# Patient Record
Sex: MALE | Race: WHITE | NOT HISPANIC OR LATINO | Employment: OTHER | URBAN - METROPOLITAN AREA
[De-identification: names, ages, dates, MRNs, and addresses within clinical notes are randomized per-mention and may not be internally consistent; named-entity substitution may affect disease eponyms.]

---

## 2017-01-12 ENCOUNTER — OFFICE VISIT (OUTPATIENT)
Dept: PAIN MEDICINE | Facility: CLINIC | Age: 67
End: 2017-01-12
Payer: MEDICARE

## 2017-01-12 VITALS
DIASTOLIC BLOOD PRESSURE: 81 MMHG | HEART RATE: 92 BPM | SYSTOLIC BLOOD PRESSURE: 147 MMHG | BODY MASS INDEX: 28.62 KG/M2 | HEIGHT: 70 IN | WEIGHT: 199.94 LBS

## 2017-01-12 DIAGNOSIS — M54.16 LUMBAR RADICULOPATHY: Primary | ICD-10-CM

## 2017-01-12 DIAGNOSIS — M54.16 LUMBAR RADICULOPATHY: ICD-10-CM

## 2017-01-12 DIAGNOSIS — M51.36 DDD (DEGENERATIVE DISC DISEASE), LUMBAR: Primary | ICD-10-CM

## 2017-01-12 DIAGNOSIS — M48.061 LUMBAR STENOSIS: ICD-10-CM

## 2017-01-12 PROCEDURE — 1159F MED LIST DOCD IN RCRD: CPT | Mod: S$GLB,,, | Performed by: PHYSICIAN ASSISTANT

## 2017-01-12 PROCEDURE — 99999 PR PBB SHADOW E&M-EST. PATIENT-LVL III: CPT | Mod: PBBFAC,,, | Performed by: PHYSICIAN ASSISTANT

## 2017-01-12 PROCEDURE — 99214 OFFICE O/P EST MOD 30 MIN: CPT | Mod: S$GLB,,, | Performed by: PHYSICIAN ASSISTANT

## 2017-01-12 RX ORDER — OXYCODONE AND ACETAMINOPHEN 7.5; 325 MG/1; MG/1
1 TABLET ORAL EVERY 12 HOURS PRN
Qty: 60 TABLET | Refills: 0 | Status: SHIPPED | OUTPATIENT
Start: 2017-01-12 | End: 2017-02-16 | Stop reason: SDUPTHER

## 2017-01-12 RX ORDER — AMOXICILLIN 500 MG
2 CAPSULE ORAL DAILY
COMMUNITY
End: 2019-04-03

## 2017-01-12 RX ORDER — GABAPENTIN 300 MG/1
300 CAPSULE ORAL 3 TIMES DAILY
Qty: 90 CAPSULE | Refills: 0 | Status: SHIPPED | OUTPATIENT
Start: 2017-01-12 | End: 2019-05-24

## 2017-01-12 RX ORDER — MULTIVITAMIN
1 TABLET ORAL DAILY
COMMUNITY

## 2017-01-12 NOTE — MR AVS SNAPSHOT
[place] [facility]  [address] [place] [address]  [address]  [place] [address]  Phone: [phone]                  Bret Black JrRobert   2017 11:00 AM   Office Visit    Description:  Male : 1950   Provider:  Rochelle Chavarria PA-C   Department:  [facility]           Reason for Visit     Low-back Pain     Leg Pain                To Do List           Goals (5 Years of Data)     None       These Medications        Disp Refills Start End    gabapentin (NEURONTIN) 300 MG capsule 90 capsule 0 2017    Take 1 capsule (300 mg total) by mouth 3 (three) times daily. Start Gabapentin 300mg TID for radicular pain. Will start at 300 mg qhs for 1 week, if tolerated, increase to 300 mg in am and pm for 1 week, if tolerated increase to TID. We will titrate up to 1587-4520 mg based on therapeutic response and SEs. - Oral    Pharmacy: [facility]  [place], [place] -  [facility]  [facility] Ph #: [phone]       oxycodone-acetaminophen (PERCOCET) 7.5-325 mg per tablet 60 tablet 0 2017    Take 1 tablet by mouth every 12 (twelve) hours as needed for Pain. - Oral    Pharmacy: [facility] [place] [place] -  [facility]  [facility] Ph #: [phone]         [facility]     [facility] -  Assistance  Registered nurses in the [facility] provide clinical advisement, health education, appointment booking, and other advisory services.  Call for this free service at [phone].             Medications           Message regarding Medications     Verify the changes and/or additions to your medication regime listed below are the same as discussed with your clinician today.  If any of these changes or additions are incorrect, please notify your healthcare provider.        START taking these NEW medications        Refills    gabapentin (NEURONTIN) 300 MG capsule 0    Sig: Take 1 capsule (300 mg total)  by mouth 3 (three) times daily. Start Gabapentin 300mg TID for radicular pain. Will start at 300 mg qhs for 1 week, if tolerated, increase to 300 mg in am and pm for 1 week, if tolerated increase to TID. We will titrate up to 1069-7629 mg based on therapeutic response and SEs.    Class: Print    Route: Oral    oxycodone-acetaminophen (PERCOCET) 7.5-325 mg per tablet 0    Sig: Take 1 tablet by mouth every 12 (twelve) hours as needed for Pain.    Class: Print    Route: Oral           Verify that the below list of medications is an accurate representation of the medications you are currently taking.  If none reported, the list may be blank. If incorrect, please contact your healthcare provider. Carry this list with you in case of emergency.           Current Medications     CYANOCOBALAMIN, VITAMIN B-12, (VITAMIN B-12 ORAL) Take by mouth.    duloxetine (CYMBALTA) 30 MG capsule Take 60 mg by mouth once daily.     fish oil-omega-3 fatty acids 300-1,000 mg capsule Take 2 g by mouth once daily.    glimepiride (AMARYL) 2 MG tablet Take 2 mg by mouth before breakfast.    metformin (GLUCOPHAGE) 1000 MG tablet Take 1,000 mg by mouth 2 (two) times daily.    multivitamin (ONE DAILY MULTIVITAMIN) per tablet Take 1 tablet by mouth once daily.    gabapentin (NEURONTIN) 300 MG capsule Take 1 capsule (300 mg total) by mouth 3 (three) times daily. Start Gabapentin 300mg TID for radicular pain. Will start at 300 mg qhs for 1 week, if tolerated, increase to 300 mg in am and pm for 1 week, if tolerated increase to TID. We will titrate up to 2756-3673 mg based on therapeutic response and SEs.    metformin (GLUCOPHAGE) 500 MG tablet Take 1,000 mg by mouth 2 (two) times daily with meals.    oxycodone-acetaminophen (PERCOCET) 7.5-325 mg per tablet Take 1 tablet by mouth every 12 (twelve) hours as needed for Pain.           Clinical Reference Information           Vital Signs - Last Recorded  Most recent update: 1/12/2017 11:12 AM by Natividad STANLEY  "Bukaske, LPN    BP Pulse Ht Wt BMI    (!) 147/81 92 5' 10" (1.778 m) 90.7 kg (199 lb 15.3 oz) 28.69 kg/m2      Blood Pressure          Most Recent Value    BP  (!)  147/81      Allergies as of 1/12/2017     No Known Allergies      Immunizations Administered on Date of Encounter - 1/12/2017     None      "

## 2017-01-12 NOTE — PROGRESS NOTES
Referring Physician: No ref. provider found    PCP: Arash Johnson MD      CC: Low back and bilateral leg pain    Interval History:    Interval History:  Mr. Black is a 66 y.o. male with chronic low back pain with radicular pain who presents today for f/u s/p b/l L4-5 TFESI. Reports >60% improvement for 3 days. Pain then gradually returned on the left. Pain on right continues to be improved. He has had good relief in the past with ESIs. Pain radiates down left posterolateral thigh to his knee. Pain is shooting in nature. He has not tried any anti neuropathic agents. Surgery has been suggested in the past but he would like to avoid surgery. He does request pain medication today. In the past he was managed on high dose opiates. He took subaxone for several years. He has not taken any medication for pain in the last year. Pain today is rated 5/10.  Pt has been seen in the clinic before, however pt is new to me.     History below per Dr. Buckley    HPI:   Patient is 65-year-old male who presents with low back and bilateral leg pain.  Pain has been present for over 15 years.  No recent traumatic incident.  His constant aching, burning pain in his low back and buttocks.  Pain radiates down his bilateral thighs just pass his bilateral knees.  Pain worsens with bending, walking, lifting, getting up.  Pain improves with rest.  MRI lumbar spine showed severe L4-5 stenosis as well as lumbar spondylosis.  He has tried physical therapy with minimal benefit.  He has not had any recent lumbar injections.  He denies any weakness.  No bowel bladder changes.  He rates his pain 4/10 today, 6/10 usually, 8/10 at worse.    ROS:  CONSTITUTIONAL: No fevers, chills, night sweats, wt. loss, appetite changes  SKIN: no rashes or itching  ENT: No headaches, head trauma, vision changes, or eye pain  LYMPH NODES: None noticed   CV: No chest pain, palpitations.   RESP: No shortness of breath, dyspnea on exertion, cough, wheezing, or hemoptysis  GI:  "No nausea, emesis, diarrhea, constipation, melena, hematochezia, pain.    : No dysuria, hematuria, urgency, or frequency   HEME: No easy bruising, bleeding problems  PSYCHIATRIC: No depression, anxiety, psychosis, hallucinations.  NEURO: No seizures, memory loss, dizziness or difficulty sleeping  MSK: + History of present illness      Past Medical History   Diagnosis Date    Arthritis     Cancer      skin    Depression     Diabetes mellitus     Hypertension      Past Surgical History   Procedure Laterality Date    Carpal tunnel release       right hand     No family history on file.  Social History     Social History    Marital status:      Spouse name: N/A    Number of children: N/A    Years of education: N/A     Social History Main Topics    Smoking status: Never Smoker    Smokeless tobacco: None    Alcohol use No    Drug use: No    Sexual activity: Not Asked     Other Topics Concern    None     Social History Narrative         Medications/Allergies: See med card    Vitals:    01/12/17 1107   BP: (!) 147/81   Pulse: 92   Weight: 90.7 kg (199 lb 15.3 oz)   Height: 5' 10" (1.778 m)   PainSc:   5   PainLoc: Back         Physical exam:    GENERAL: A and O x3, the patient appears well groomed and is in no acute distress.  Skin: No rashes or obvious lesions  HEENT: normocephalic, atraumatic  CARDIOVASCULAR:  RRR  LUNGS: non labored breathing  ABDOMEN: soft, nontender   UPPER EXTREMITIES: Normal alignment, normal range of motion, no atrophy, no skin changes,  hair growth and nail growth normal and equal bilaterally. No swelling, no tenderness.    LOWER EXTREMITIES:  Normal alignment, normal range of motion, no atrophy, no skin changes,  hair growth and nail growth normal and equal bilaterally. No swelling, no tenderness.    LUMBAR SPINE  Lumbar spine: ROM is limited with flexion extension and oblique extension with moderate increased pain.    Juan's test causes no increased pain on either side. "    Supine straight leg raise is negative bilaterally.    Internal and external rotation of the hip causes no increased pain on either side.  Myofascial exam: Mild tenderness to palpation across lumbar paraspinous muscles.      MENTAL STATUS: normal orientation, speech, language, and fund of knowledge for social situation.  Emotional state appropriate.    CRANIAL NERVES:  II:  PERRL bilaterally,   III,IV,VI: EOMI.    V:  Facial sensation equal bilaterally  VII:  Facial motor function normal.  VIII:  Hearing equal to finger rub bilaterally  IX/X: Gag normal, palate symmetric  XI:  Shoulder shrug equal, head turn equal  XII:  Tongue midline without fasciculations      MOTOR: Tone and bulk: normal bilateral upper and lower Strength: normal   Delt Bi Tri WE WF     R 5 5 5 5 5 5   L 5 5 5 5 5 5     IP ADD ABD Quad TA Gas HAM  R 5 5 5 5 5 5 5  L 5 5 5 5 5 5 5    SENSATION: Light touch and pinprick intact bilaterally  REFLEXES: normal, symmetric, nonbrisk.  Toes down, no clonus. No hoffmans.  GAIT: uses cane for assistance      Imaging:  MRI L-spine 1/2015  Multilevel degenerative lumbar spondylosis resulting in:  L2-3 mild spinal canal narrowing  L3-4 moderate spinal canal narrowing  L4-5 severe spinal canal narrowing.    Assessment:  Mr. Black is a 66 y.o. male with low back and left leg pain   1. DDD (degenerative disc disease), lumbar    2. Lumbar radiculopathy    3. Lumbar stenosis          Plan:  1. I have stressed the importance of physical activity and exercise to improve overall health  2. Schedule left L4-5 and L5-S1 TFESI. I have explained the risks, benefits, and alternatives of the procedure in detail. The patient voices understanding and all questions have been answered. The patient agrees to proceed as planned. Written Consent obtained.   3. Percocet 7.5/325 mg q 12 h prn #60  4. Start Gabapentin 300mg TID for radicular pain. Will start at 300 mg qhs for 1 week, if tolerated, increase to 300 mg in am and  pm for 1 week, if tolerated increase to TID. We will titrate up to 4978-5498 mg based on therapeutic response and SEs.  5. F/u 3 weeks s/p TANNER  All medication management was performed by Dr. Milan Buckley

## 2017-01-19 ENCOUNTER — SURGERY (OUTPATIENT)
Age: 67
End: 2017-01-19

## 2017-01-19 ENCOUNTER — HOSPITAL ENCOUNTER (OUTPATIENT)
Facility: AMBULARY SURGERY CENTER | Age: 67
Discharge: HOME | End: 2017-01-19
Attending: ANESTHESIOLOGY | Admitting: ANESTHESIOLOGY
Payer: MEDICARE

## 2017-01-19 DIAGNOSIS — M51.36 DDD (DEGENERATIVE DISC DISEASE), LUMBAR: Primary | ICD-10-CM

## 2017-01-19 LAB — POCT GLUCOSE: 148 MG/DL (ref 70–110)

## 2017-01-19 PROCEDURE — 64483 NJX AA&/STRD TFRM EPI L/S 1: CPT | Mod: LT,,, | Performed by: ANESTHESIOLOGY

## 2017-01-19 PROCEDURE — 64484 NJX AA&/STRD TFRM EPI L/S EA: CPT | Performed by: ANESTHESIOLOGY

## 2017-01-19 PROCEDURE — 64484 NJX AA&/STRD TFRM EPI L/S EA: CPT | Mod: LT,,, | Performed by: ANESTHESIOLOGY

## 2017-01-19 PROCEDURE — 64483 NJX AA&/STRD TFRM EPI L/S 1: CPT | Performed by: ANESTHESIOLOGY

## 2017-01-19 PROCEDURE — 99152 MOD SED SAME PHYS/QHP 5/>YRS: CPT | Mod: ,,, | Performed by: ANESTHESIOLOGY

## 2017-01-19 RX ORDER — BUPIVACAINE HYDROCHLORIDE 2.5 MG/ML
INJECTION, SOLUTION EPIDURAL; INFILTRATION; INTRACAUDAL
Status: DISCONTINUED | OUTPATIENT
Start: 2017-01-19 | End: 2017-01-19 | Stop reason: HOSPADM

## 2017-01-19 RX ORDER — MIDAZOLAM HYDROCHLORIDE 1 MG/ML
INJECTION INTRAMUSCULAR; INTRAVENOUS
Status: DISCONTINUED | OUTPATIENT
Start: 2017-01-19 | End: 2017-01-19 | Stop reason: HOSPADM

## 2017-01-19 RX ORDER — FENTANYL CITRATE 50 UG/ML
INJECTION, SOLUTION INTRAMUSCULAR; INTRAVENOUS
Status: DISCONTINUED | OUTPATIENT
Start: 2017-01-19 | End: 2017-01-19 | Stop reason: HOSPADM

## 2017-01-19 RX ORDER — FENTANYL CITRATE 50 UG/ML
INJECTION, SOLUTION INTRAMUSCULAR; INTRAVENOUS
Status: DISCONTINUED
Start: 2017-01-19 | End: 2017-01-19 | Stop reason: HOSPADM

## 2017-01-19 RX ORDER — SODIUM CHLORIDE, SODIUM LACTATE, POTASSIUM CHLORIDE, CALCIUM CHLORIDE 600; 310; 30; 20 MG/100ML; MG/100ML; MG/100ML; MG/100ML
INJECTION, SOLUTION INTRAVENOUS ONCE AS NEEDED
Status: COMPLETED | OUTPATIENT
Start: 2017-01-19 | End: 2017-01-19

## 2017-01-19 RX ORDER — DEXAMETHASONE SODIUM PHOSPHATE 10 MG/ML
INJECTION INTRAMUSCULAR; INTRAVENOUS
Status: DISCONTINUED
Start: 2017-01-19 | End: 2017-01-19 | Stop reason: HOSPADM

## 2017-01-19 RX ORDER — LIDOCAINE HYDROCHLORIDE 10 MG/ML
INJECTION, SOLUTION EPIDURAL; INFILTRATION; INTRACAUDAL; PERINEURAL
Status: DISCONTINUED | OUTPATIENT
Start: 2017-01-19 | End: 2017-01-19 | Stop reason: HOSPADM

## 2017-01-19 RX ORDER — ALPRAZOLAM 1 MG/1
1 TABLET, ORALLY DISINTEGRATING ORAL
Status: DISCONTINUED | OUTPATIENT
Start: 2017-01-19 | End: 2017-01-19 | Stop reason: HOSPADM

## 2017-01-19 RX ORDER — MIDAZOLAM HYDROCHLORIDE 1 MG/ML
INJECTION INTRAMUSCULAR; INTRAVENOUS
Status: DISCONTINUED
Start: 2017-01-19 | End: 2017-01-19 | Stop reason: HOSPADM

## 2017-01-19 RX ORDER — DEXAMETHASONE SODIUM PHOSPHATE 10 MG/ML
INJECTION INTRAMUSCULAR; INTRAVENOUS
Status: DISCONTINUED | OUTPATIENT
Start: 2017-01-19 | End: 2017-01-19 | Stop reason: HOSPADM

## 2017-01-19 RX ADMIN — FENTANYL CITRATE 75 MCG: 50 INJECTION, SOLUTION INTRAMUSCULAR; INTRAVENOUS at 12:01

## 2017-01-19 RX ADMIN — BUPIVACAINE HYDROCHLORIDE 3 ML: 2.5 INJECTION, SOLUTION EPIDURAL; INFILTRATION; INTRACAUDAL at 12:01

## 2017-01-19 RX ADMIN — MIDAZOLAM HYDROCHLORIDE 2 MG: 1 INJECTION INTRAMUSCULAR; INTRAVENOUS at 12:01

## 2017-01-19 RX ADMIN — SODIUM CHLORIDE, SODIUM LACTATE, POTASSIUM CHLORIDE, CALCIUM CHLORIDE: 600; 310; 30; 20 INJECTION, SOLUTION INTRAVENOUS at 12:01

## 2017-01-19 RX ADMIN — DEXAMETHASONE SODIUM PHOSPHATE 10 MG: 10 INJECTION INTRAMUSCULAR; INTRAVENOUS at 12:01

## 2017-01-19 RX ADMIN — LIDOCAINE HYDROCHLORIDE 10 ML: 10 INJECTION, SOLUTION EPIDURAL; INFILTRATION; INTRACAUDAL; PERINEURAL at 12:01

## 2017-01-19 NOTE — IP AVS SNAPSHOT
[facility] [place] Location  [address] [facility] [address]           Patient Discharge Instructions     Our goal is to set you up for success. This packet includes information on your condition, medications, and your home care. It will help you to care for yourself so you don't get sicker and need to go back to the hospital.     Please ask your nurse if you have any questions.        There are many details to remember when preparing to leave the hospital. Here is what you will need to do:    1. Take your medicine. If you are prescribed medications, review your Medication List in the following pages. You may have new medications to  at the pharmacy and others that you'll need to stop taking. Review the instructions for how and when to take your medications. Talk with your doctor or nurses if you are unsure of what to do.     2. Go to your follow-up appointments. Specific follow-up information is listed in the following pages. Your may be contacted by a transition nurse or clinical provider about future appointments. Be sure we have all of the phone numbers to reach you, if needed. Please contact your provider's office if you are unable to make an appointment.     3. Watch for warning signs. Your doctor or nurse will give you detailed warning signs to watch for and when to call for assistance. These instructions may also include educational information about your condition. If you experience any of warning signs to your health, call your doctor.               [facility]  Unless otherwise directed by your provider, please contact [facility], our nurse care line that is available for 24/7 assistance.     [phone] (toll-free)    Registered nurses in the [facility] provide clinical advisement, health education, appointment booking, and other advisory services.                    ** Verify the list of medication(s) below is accurate and up  to date. Carry this with you in case of emergency. If your medications have changed, please notify your healthcare provider.             Medication List      CONTINUE taking these medications        Additional Info                      duloxetine 30 MG capsule   Commonly known as:  CYMBALTA   Refills:  0   Dose:  60 mg    Instructions:  Take 60 mg by mouth once daily.     Begin Date    AM    Noon    PM    Bedtime       fish oil-omega-3 fatty acids 300-1,000 mg capsule   Refills:  0   Dose:  2 g    Instructions:  Take 2 g by mouth once daily.     Begin Date    AM    Noon    PM    Bedtime       gabapentin 300 MG capsule   Commonly known as:  NEURONTIN   Quantity:  90 capsule   Refills:  0   Dose:  300 mg    Instructions:  Take 1 capsule (300 mg total) by mouth 3 (three) times daily. Start Gabapentin 300mg TID for radicular pain. Will start at 300 mg qhs for 1 week, if tolerated, increase to 300 mg in am and pm for 1 week, if tolerated increase to TID. We will titrate up to 4816-4009 mg based on therapeutic response and SEs.     Begin Date    AM    Noon    PM    Bedtime       glimepiride 2 MG tablet   Commonly known as:  AMARYL   Refills:  0   Dose:  2 mg    Instructions:  Take 2 mg by mouth before breakfast.     Begin Date    AM    Noon    PM    Bedtime       * metformin 500 MG tablet   Commonly known as:  GLUCOPHAGE   Refills:  0   Dose:  1000 mg    Instructions:  Take 1,000 mg by mouth 2 (two) times daily with meals.     Begin Date    AM    Noon    PM    Bedtime       * metformin 1000 MG tablet   Commonly known as:  GLUCOPHAGE   Refills:  0   Dose:  1000 mg    Instructions:  Take 1,000 mg by mouth 2 (two) times daily.     Begin Date    AM    Noon    PM    Bedtime       ONE DAILY MULTIVITAMIN per tablet   Refills:  0   Dose:  1 tablet   Generic drug:  multivitamin    Instructions:  Take 1 tablet by mouth once daily.     Begin Date    AM    Noon    PM    Bedtime       oxycodone-acetaminophen 7.5-325 mg per tablet    Commonly known as:  PERCOCET   Quantity:  60 tablet   Refills:  0   Dose:  1 tablet    Instructions:  Take 1 tablet by mouth every 12 (twelve) hours as needed for Pain.     Begin Date    AM    Noon    PM    Bedtime       VITAMIN B-12 ORAL   Refills:  0    Instructions:  Take by mouth.     Begin Date    AM    Noon    PM    Bedtime       * Notice:  This list has 2 medication(s) that are the same as other medications prescribed for you. Read the directions carefully, and ask your doctor or other care provider to review them with you.               Please bring to all follow up appointments:    1. A copy of your discharge instructions.  2. All medicines you are currently taking in their original bottles.  3. Identification and insurance card.    Please arrive 15 minutes ahead of scheduled appointment time.    Please call 24 hours in advance if you must reschedule your appointment and/or time.        Your Scheduled Appointments     Feb 16, 2017  9:30 AM CST   Established Patient Visit with [facility] ([facility] - [address] [place] [address]  [place] [address]   [phone]                Discharge Instructions     Future Orders    Activity as tolerated     Call MD for:  difficulty breathing or increased cough     Call MD for:  persistent nausea and vomiting or diarrhea     Call MD for:  redness, tenderness, or signs of infection (pain, swelling, redness, odor or green/yellow discharge around incision site)     Call MD for:  severe persistent headache     Call MD for:  severe uncontrolled pain     Call MD for:  temperature >100.4     Diet general     Questions:    Total calories:      Fat restriction, if any:      Protein restriction, if any:      Na restriction, if any:      Fluid restriction:      Additional restrictions:          Discharge Instructions       Pain injection instructions:       No driving for 24 hrs   Activity as tolerated-  gradually increase activities.  Dont lift over 10 lbs for 24 hrs  No heat at injection sites x 2 days  Use ice for mild swelling and for comfort.  May shower today. No baths for two days.      Resume Aspirin, Plavix, or Coumadin the day after the procedure unless otherwise instructed.   If diabetic,monitor your glucose carefully as steroids can increase glucose level    Seek immediate medical help for:   Severe increase in your usual pain or appearance of new pain.  Prolonged or increasing weakness or numbness in the legs or arms.  Fever above 101 ,Drainage,redness,active bleeding, or increased swelling at the injection site.  Headache, shortness of breath, chest pain, or breathing problems.      Lumbar Epidural Injection: Recovery at Home    After a lumbar epidural injection, you dont need to stay in bed when you get home. In fact, its best to walk around if you feel up to it. Just be careful about being too active. Even if you feel better right away, avoid activities that may strain your back. And follow up on all treatment with your doctor.  What to know about pain relief  Keep in mind that some people feel more pain at first. It usually goes away within a few days. You may also have headaches or trouble sleeping, but if these symptoms are severe, you should call your doctor right away. These should also go away within a few days. In general:  · An injection to reduce inflammation takes a few days to work, sometimes even up to a week. There may even be more pain at first.  Tips for recovery  Whether you were injected for pain relief or diagnosis, these tips will help you recover:  · Take walks when you feel up to it.  · Rest if needed, but get up and move around after sitting for half an hour.  · Dont exercise vigorously.  · Dont drive the day of the procedure or until your doctor says its OK.  · Return to work or other activities when your doctor says youre ready.  When to call your doctor  Call right away  "if you notice any of the following symptoms:  · Severe pain or headache  · Loss of bladder or bowel control  · Fever or chills  · Redness or swelling around the injection site       © 9884-0155 [employer]. [address] [place] [address], [place] [address]. All rights reserved. This information is not intended as a substitute for professional medical care. Always follow your healthcare professional's instructions.            Primary Diagnosis     Your primary diagnosis was:  Degeneration Of Lumbar Or Lumbosacral Intervertebral Disc      Admission Information     Date & Time Provider Department CSN    1/19/2017 11:45 AM Milan Buckley MD [facility] [id number]      Care Providers     Provider Role Specialty Primary office phone    Milan Buckley MD Attending Provider Pain Medicine [phone]    Milan Buckley MD Surgeon  Pain Medicine [phone]      Your Vitals Were     BP Pulse Temp Resp Height Weight    138/59 72 97.9 °F (36.6 °C) (Oral) 16 5' 10" (1.778 m) 90.3 kg (199 lb)    SpO2 BMI             94% 28.55 kg/m2         Recent Lab Values     No lab values to display.      Allergies as of 1/19/2017     No Known Allergies      Language Assistance Services     ATTENTION: Language assistance services are available, free of charge. Please call [phone].      ATENCIÓN: Si vaughn zach, tiene a johnson disposición servicios gratuitos de asistencia lingüística. Llame al [phone].     CHÚ Ý: N?u b?n nói Ti?ng Vi?t, có các d?ch v? h? tr? ngôn ng? mi?n phí dành cho b?n. G?i s? [phone].         [facility] complies with applicable Federal civil rights laws and does not discriminate on the basis of race, color, national origin, age, disability, or sex.        "

## 2017-01-19 NOTE — H&P (VIEW-ONLY)
Referring Physician: No ref. provider found    PCP: Arash Johnson MD      CC: Low back and bilateral leg pain    Interval History:    Interval History:  Mr. Black is a 66 y.o. male with chronic low back pain with radicular pain who presents today for f/u s/p b/l L4-5 TFESI. Reports >60% improvement for 3 days. Pain then gradually returned on the left. Pain on right continues to be improved. He has had good relief in the past with ESIs. Pain radiates down left posterolateral thigh to his knee. Pain is shooting in nature. He has not tried any anti neuropathic agents. Surgery has been suggested in the past but he would like to avoid surgery. He does request pain medication today. In the past he was managed on high dose opiates. He took subaxone for several years. He has not taken any medication for pain in the last year. Pain today is rated 5/10.  Pt has been seen in the clinic before, however pt is new to me.     History below per Dr. Buckley    HPI:   Patient is 65-year-old male who presents with low back and bilateral leg pain.  Pain has been present for over 15 years.  No recent traumatic incident.  His constant aching, burning pain in his low back and buttocks.  Pain radiates down his bilateral thighs just pass his bilateral knees.  Pain worsens with bending, walking, lifting, getting up.  Pain improves with rest.  MRI lumbar spine showed severe L4-5 stenosis as well as lumbar spondylosis.  He has tried physical therapy with minimal benefit.  He has not had any recent lumbar injections.  He denies any weakness.  No bowel bladder changes.  He rates his pain 4/10 today, 6/10 usually, 8/10 at worse.    ROS:  CONSTITUTIONAL: No fevers, chills, night sweats, wt. loss, appetite changes  SKIN: no rashes or itching  ENT: No headaches, head trauma, vision changes, or eye pain  LYMPH NODES: None noticed   CV: No chest pain, palpitations.   RESP: No shortness of breath, dyspnea on exertion, cough, wheezing, or hemoptysis  GI:  "No nausea, emesis, diarrhea, constipation, melena, hematochezia, pain.    : No dysuria, hematuria, urgency, or frequency   HEME: No easy bruising, bleeding problems  PSYCHIATRIC: No depression, anxiety, psychosis, hallucinations.  NEURO: No seizures, memory loss, dizziness or difficulty sleeping  MSK: + History of present illness      Past Medical History   Diagnosis Date    Arthritis     Cancer      skin    Depression     Diabetes mellitus     Hypertension      Past Surgical History   Procedure Laterality Date    Carpal tunnel release       right hand     No family history on file.  Social History     Social History    Marital status:      Spouse name: N/A    Number of children: N/A    Years of education: N/A     Social History Main Topics    Smoking status: Never Smoker    Smokeless tobacco: None    Alcohol use No    Drug use: No    Sexual activity: Not Asked     Other Topics Concern    None     Social History Narrative         Medications/Allergies: See med card    Vitals:    01/12/17 1107   BP: (!) 147/81   Pulse: 92   Weight: 90.7 kg (199 lb 15.3 oz)   Height: 5' 10" (1.778 m)   PainSc:   5   PainLoc: Back         Physical exam:    GENERAL: A and O x3, the patient appears well groomed and is in no acute distress.  Skin: No rashes or obvious lesions  HEENT: normocephalic, atraumatic  CARDIOVASCULAR:  RRR  LUNGS: non labored breathing  ABDOMEN: soft, nontender   UPPER EXTREMITIES: Normal alignment, normal range of motion, no atrophy, no skin changes,  hair growth and nail growth normal and equal bilaterally. No swelling, no tenderness.    LOWER EXTREMITIES:  Normal alignment, normal range of motion, no atrophy, no skin changes,  hair growth and nail growth normal and equal bilaterally. No swelling, no tenderness.    LUMBAR SPINE  Lumbar spine: ROM is limited with flexion extension and oblique extension with moderate increased pain.    Juan's test causes no increased pain on either side. "    Supine straight leg raise is negative bilaterally.    Internal and external rotation of the hip causes no increased pain on either side.  Myofascial exam: Mild tenderness to palpation across lumbar paraspinous muscles.      MENTAL STATUS: normal orientation, speech, language, and fund of knowledge for social situation.  Emotional state appropriate.    CRANIAL NERVES:  II:  PERRL bilaterally,   III,IV,VI: EOMI.    V:  Facial sensation equal bilaterally  VII:  Facial motor function normal.  VIII:  Hearing equal to finger rub bilaterally  IX/X: Gag normal, palate symmetric  XI:  Shoulder shrug equal, head turn equal  XII:  Tongue midline without fasciculations      MOTOR: Tone and bulk: normal bilateral upper and lower Strength: normal   Delt Bi Tri WE WF     R 5 5 5 5 5 5   L 5 5 5 5 5 5     IP ADD ABD Quad TA Gas HAM  R 5 5 5 5 5 5 5  L 5 5 5 5 5 5 5    SENSATION: Light touch and pinprick intact bilaterally  REFLEXES: normal, symmetric, nonbrisk.  Toes down, no clonus. No hoffmans.  GAIT: uses cane for assistance      Imaging:  MRI L-spine 1/2015  Multilevel degenerative lumbar spondylosis resulting in:  L2-3 mild spinal canal narrowing  L3-4 moderate spinal canal narrowing  L4-5 severe spinal canal narrowing.    Assessment:  Mr. Black is a 66 y.o. male with low back and left leg pain   1. DDD (degenerative disc disease), lumbar    2. Lumbar radiculopathy    3. Lumbar stenosis          Plan:  1. I have stressed the importance of physical activity and exercise to improve overall health  2. Schedule left L4-5 and L5-S1 TFESI. I have explained the risks, benefits, and alternatives of the procedure in detail. The patient voices understanding and all questions have been answered. The patient agrees to proceed as planned. Written Consent obtained.   3. Percocet 7.5/325 mg q 12 h prn #60  4. Start Gabapentin 300mg TID for radicular pain. Will start at 300 mg qhs for 1 week, if tolerated, increase to 300 mg in am and  pm for 1 week, if tolerated increase to TID. We will titrate up to 9345-2923 mg based on therapeutic response and SEs.  5. F/u 3 weeks s/p TANNER  All medication management was performed by Dr. Milan Buckley

## 2017-01-19 NOTE — INTERVAL H&P NOTE
The patient has been examined and the H&P has been reviewed:    I concur with the findings and no changes have occurred since H&P was written.  ASA 3    Anesthesia/Surgery risks, benefits and alternative options discussed and understood by patient/family.          Active Hospital Problems    Diagnosis  POA    DDD (degenerative disc disease), lumbar [M51.36]  Yes      Resolved Hospital Problems    Diagnosis Date Resolved POA   No resolved problems to display.

## 2017-01-19 NOTE — OP NOTE
PROCEDURE DATE: 1/19/2017    PROCEDURE: Left L4-5 and L5-S1 transforaminal epidural steroid injection under fluoroscopy    DIAGNOSIS: Lumbar disc displacement without myelopathy  Post op diagnosis: Same    PHYSICIAN: Milan Buckley MD    MEDICATIONS INJECTED:  Dexamethasone 5mg (0.5ml) and 1.5ml 0.25% bupivicaine at each nerve root.     LOCAL ANESTHETIC INJECTED:  Lidocaine 1%. 2 ml per site.    SEDATION MEDICATIONS: RN IV sedation    ESTIMATED BLOOD LOSS:  None    COMPLICATIONS:  None    TECHNIQUE:   A time-out was taken to identify patient and procedure side prior to starting the procedure. The patient was placed in a prone position, prepped and draped in the usual sterile fashion using ChloraPrep and sterile towels.  The area to be injected was determined under fluoroscopic guidance in AP and oblique view.  Local anesthetic was given by raising a wheal and going down to the hub of a 25-gauge 1.5 inch needle.  In oblique view, a 3.5 inch 22-gauge bent-tip spinal needle was introduced towards 6 oclock position of the pedicle of each above named nerve root level.  The needle was walked medially then hinged into the neural foramen and position was confirmed in AP and lateral views.  1ml contrast dye was injected to confirm appropriate placement and that there was no vascular uptake.  After negative aspiration for blood or CSF, the medication was then injected. This was performed at the left L4-5 and L5-S1 level(s). The patient tolerated the procedure well.    The patient was monitored after the procedure.  Patient was given post procedure and discharge instructions to follow at home. The patient was discharged in a stable condition.

## 2017-01-19 NOTE — DISCHARGE SUMMARY
[facility]  Discharge Note  Short Stay    Admit Date: 1/19/2017    Discharge Date and Time: 1/19/2017    Attending Physician: Milan Buckley MD     Discharge Provider: Milan Buckley    Diagnoses:  Active Hospital Problems    Diagnosis  POA    *DDD (degenerative disc disease), lumbar [M51.36]  Yes      Resolved Hospital Problems    Diagnosis Date Resolved POA   No resolved problems to display.       Hospital Course: Lumbar TANNER  Discharged Condition: Good    Final Diagnoses:   Active Hospital Problems    Diagnosis  POA    *DDD (degenerative disc disease), lumbar [M51.36]  Yes      Resolved Hospital Problems    Diagnosis Date Resolved POA   No resolved problems to display.       Disposition: Home or Self Care    Follow up/Patient Instructions:    Medications:  Reconciled Home Medications:   Current Discharge Medication List      CONTINUE these medications which have NOT CHANGED    Details   CYANOCOBALAMIN, VITAMIN B-12, (VITAMIN B-12 ORAL) Take by mouth.      duloxetine (CYMBALTA) 30 MG capsule Take 60 mg by mouth once daily.       fish oil-omega-3 fatty acids 300-1,000 mg capsule Take 2 g by mouth once daily.      gabapentin (NEURONTIN) 300 MG capsule Take 1 capsule (300 mg total) by mouth 3 (three) times daily. Start Gabapentin 300mg TID for radicular pain. Will start at 300 mg qhs for 1 week, if tolerated, increase to 300 mg in am and pm for 1 week, if tolerated increase to TID. We will titrate up to 0970-2145 mg based on therapeutic response and SEs.  Qty: 90 capsule, Refills: 0      glimepiride (AMARYL) 2 MG tablet Take 2 mg by mouth before breakfast.      !! metformin (GLUCOPHAGE) 1000 MG tablet Take 1,000 mg by mouth 2 (two) times daily.  Refills: 0      !! metformin (GLUCOPHAGE) 500 MG tablet Take 1,000 mg by mouth 2 (two) times daily with meals.      multivitamin (ONE DAILY MULTIVITAMIN) per tablet Take 1 tablet by mouth once daily.      oxycodone-acetaminophen (PERCOCET) 7.5-325 mg per tablet Take 1  tablet by mouth every 12 (twelve) hours as needed for Pain.  Qty: 60 tablet, Refills: 0       !! - Potential duplicate medications found. Please discuss with provider.          Discharge Procedure Orders  Diet general     Activity as tolerated     Call MD for:  temperature >100.4     Call MD for:  persistent nausea and vomiting or diarrhea     Call MD for:  severe uncontrolled pain     Call MD for:  redness, tenderness, or signs of infection (pain, swelling, redness, odor or green/yellow discharge around incision site)     Call MD for:  difficulty breathing or increased cough     Call MD for:  severe persistent headache          Follow up with MD in 2-3 weeks    Discharge Procedure Orders (must include Diet, Follow-up, Activity):    Discharge Procedure Orders (must include Diet, Follow-up, Activity)  Diet general     Activity as tolerated     Call MD for:  temperature >100.4     Call MD for:  persistent nausea and vomiting or diarrhea     Call MD for:  severe uncontrolled pain     Call MD for:  redness, tenderness, or signs of infection (pain, swelling, redness, odor or green/yellow discharge around incision site)     Call MD for:  difficulty breathing or increased cough     Call MD for:  severe persistent headache

## 2017-01-19 NOTE — DISCHARGE INSTRUCTIONS
Pain injection instructions:       No driving for 24 hrs   Activity as tolerated- gradually increase activities.  Dont lift over 10 lbs for 24 hrs  No heat at injection sites x 2 days  Use ice for mild swelling and for comfort.  May shower today. No baths for two days.      Resume Aspirin, Plavix, or Coumadin the day after the procedure unless otherwise instructed.   If diabetic,monitor your glucose carefully as steroids can increase glucose level    Seek immediate medical help for:   Severe increase in your usual pain or appearance of new pain.  Prolonged or increasing weakness or numbness in the legs or arms.  Fever above 101 ,Drainage,redness,active bleeding, or increased swelling at the injection site.  Headache, shortness of breath, chest pain, or breathing problems.      Lumbar Epidural Injection: Recovery at Home    After a lumbar epidural injection, you dont need to stay in bed when you get home. In fact, its best to walk around if you feel up to it. Just be careful about being too active. Even if you feel better right away, avoid activities that may strain your back. And follow up on all treatment with your doctor.  What to know about pain relief  Keep in mind that some people feel more pain at first. It usually goes away within a few days. You may also have headaches or trouble sleeping, but if these symptoms are severe, you should call your doctor right away. These should also go away within a few days. In general:  · An injection to reduce inflammation takes a few days to work, sometimes even up to a week. There may even be more pain at first.  Tips for recovery  Whether you were injected for pain relief or diagnosis, these tips will help you recover:  · Take walks when you feel up to it.  · Rest if needed, but get up and move around after sitting for half an hour.  · Dont exercise vigorously.  · Dont drive the day of the procedure or until your doctor says its OK.  · Return to work or other  activities when your doctor says youre ready.  When to call your doctor  Call right away if you notice any of the following symptoms:  · Severe pain or headache  · Loss of bladder or bowel control  · Fever or chills  · Redness or swelling around the injection site       © 2243-2085 [employer]. [address] [place] [id number]. All rights reserved. This information is not intended as a substitute for professional medical care. Always follow your healthcare professional's instructions.

## 2017-01-23 VITALS
OXYGEN SATURATION: 97 % | HEIGHT: 70 IN | TEMPERATURE: 97 F | RESPIRATION RATE: 18 BRPM | SYSTOLIC BLOOD PRESSURE: 131 MMHG | DIASTOLIC BLOOD PRESSURE: 55 MMHG | HEART RATE: 70 BPM | WEIGHT: 199 LBS | BODY MASS INDEX: 28.49 KG/M2

## 2017-02-16 ENCOUNTER — OFFICE VISIT (OUTPATIENT)
Dept: PAIN MEDICINE | Facility: CLINIC | Age: 67
End: 2017-02-16
Payer: MEDICARE

## 2017-02-16 VITALS
SYSTOLIC BLOOD PRESSURE: 163 MMHG | DIASTOLIC BLOOD PRESSURE: 96 MMHG | HEART RATE: 91 BPM | WEIGHT: 199.06 LBS | HEIGHT: 70 IN | BODY MASS INDEX: 28.5 KG/M2

## 2017-02-16 DIAGNOSIS — M54.16 LUMBAR RADICULOPATHY: Primary | ICD-10-CM

## 2017-02-16 DIAGNOSIS — M48.061 LUMBAR STENOSIS: ICD-10-CM

## 2017-02-16 DIAGNOSIS — M51.36 DDD (DEGENERATIVE DISC DISEASE), LUMBAR: ICD-10-CM

## 2017-02-16 PROCEDURE — 99214 OFFICE O/P EST MOD 30 MIN: CPT | Mod: S$GLB,,, | Performed by: PHYSICIAN ASSISTANT

## 2017-02-16 PROCEDURE — 99999 PR PBB SHADOW E&M-EST. PATIENT-LVL III: CPT | Mod: PBBFAC,,, | Performed by: PHYSICIAN ASSISTANT

## 2017-02-16 RX ORDER — OXYCODONE AND ACETAMINOPHEN 7.5; 325 MG/1; MG/1
1 TABLET ORAL EVERY 12 HOURS PRN
Qty: 60 TABLET | Refills: 0 | Status: SHIPPED | OUTPATIENT
Start: 2017-02-16 | End: 2017-03-08 | Stop reason: SDUPTHER

## 2017-02-16 RX ORDER — DULOXETIN HYDROCHLORIDE 60 MG/1
60 CAPSULE, DELAYED RELEASE ORAL DAILY
Refills: 0 | COMMUNITY
Start: 2017-01-13 | End: 2019-08-05 | Stop reason: SDUPTHER

## 2017-02-16 NOTE — PROGRESS NOTES
Referring Physician: No ref. provider found    PCP: Arash Johnson MD      CC: Low back and bilateral leg pain    Interval History:    Interval History:  Mr. Black is a 66 y.o. male with chronic low back pain with radicular pain who presents today for f/u s/p b/l L4-5 TFESI. Reports >50% improvement. Pain is now intermittent and shooting pain down LE has decreased in intensity. Pain radiates down left posterolateral thigh to his knee. Pain is shooting in nature. He has not tried any anti neuropathic agents. Surgery has been suggested in the past but he would like to avoid surgery. He does request refill of Oxycodone today. In the past he was managed on high dose opiates. He took subaxone for several years.  He was prescribed Gabapentin LCV but he was concerned that it would cause drowsiness. Pain today is rated 2/10.    HPI:   Patient is 65-year-old male who presents with low back and bilateral leg pain.  Pain has been present for over 15 years.  No recent traumatic incident.  His constant aching, burning pain in his low back and buttocks.  Pain radiates down his bilateral thighs just pass his bilateral knees.  Pain worsens with bending, walking, lifting, getting up.  Pain improves with rest.  MRI lumbar spine showed severe L4-5 stenosis as well as lumbar spondylosis.  He has tried physical therapy with minimal benefit.  He has not had any recent lumbar injections.  He denies any weakness.  No bowel bladder changes.  He rates his pain 4/10 today, 6/10 usually, 8/10 at worse.    ROS:  CONSTITUTIONAL: No fevers, chills, night sweats, wt. loss, appetite changes  SKIN: no rashes or itching  ENT: No headaches, head trauma, vision changes, or eye pain  LYMPH NODES: None noticed   CV: No chest pain, palpitations.   RESP: No shortness of breath, dyspnea on exertion, cough, wheezing, or hemoptysis  GI: No nausea, emesis, diarrhea, constipation, melena, hematochezia, pain.    : No dysuria, hematuria, urgency, or  "frequency   HEME: No easy bruising, bleeding problems  PSYCHIATRIC: No depression, anxiety, psychosis, hallucinations.  NEURO: No seizures, memory loss, dizziness or difficulty sleeping  MSK: + History of present illness      Past Medical History   Diagnosis Date    Arthritis     Cancer      skin    Depression     Diabetes mellitus     Hypertension      Past Surgical History   Procedure Laterality Date    Carpal tunnel release       right hand     History reviewed. No pertinent family history.  Social History     Social History    Marital status:      Spouse name: N/A    Number of children: N/A    Years of education: N/A     Social History Main Topics    Smoking status: Never Smoker    Smokeless tobacco: None    Alcohol use No    Drug use: No    Sexual activity: Not Asked     Other Topics Concern    None     Social History Narrative         Medications/Allergies: See med card    Vitals:    02/16/17 0934   BP: (!) 163/96   Pulse: 91   Weight: 90.3 kg (199 lb 1.2 oz)   Height: 5' 10" (1.778 m)   PainSc:   2   PainLoc: Back         Physical exam:    GENERAL: A and O x3, the patient appears well groomed and is in no acute distress.  Skin: No rashes or obvious lesions  HEENT: normocephalic, atraumatic  CARDIOVASCULAR:  RRR  LUNGS: non labored breathing  ABDOMEN: soft, nontender   UPPER EXTREMITIES: Normal alignment, normal range of motion, no atrophy, no skin changes,  hair growth and nail growth normal and equal bilaterally. No swelling, no tenderness.    LOWER EXTREMITIES:  Normal alignment, normal range of motion, no atrophy, no skin changes,  hair growth and nail growth normal and equal bilaterally. No swelling, no tenderness.    LUMBAR SPINE  Lumbar spine: ROM is limited with flexion extension and oblique extension with moderate increased pain.    Juan's test causes no increased pain on either side.    Supine straight leg raise is negative bilaterally.    Internal and external rotation of " the hip causes no increased pain on either side.  Myofascial exam: Mild tenderness to palpation across lumbar paraspinous muscles.      MENTAL STATUS: normal orientation, speech, language, and fund of knowledge for social situation.  Emotional state appropriate.    CRANIAL NERVES:  II:  PERRL bilaterally,   III,IV,VI: EOMI.    V:  Facial sensation equal bilaterally  VII:  Facial motor function normal.  VIII:  Hearing equal to finger rub bilaterally  IX/X: Gag normal, palate symmetric  XI:  Shoulder shrug equal, head turn equal  XII:  Tongue midline without fasciculations      MOTOR: Tone and bulk: normal bilateral upper and lower Strength: normal   Delt Bi Tri WE WF     R 5 5 5 5 5 5   L 5 5 5 5 5 5     IP ADD ABD Quad TA Gas HAM  R 5 5 5 5 5 5 5  L 5 5 5 5 5 5 5    SENSATION: Light touch and pinprick intact bilaterally  REFLEXES: normal, symmetric, nonbrisk.  Toes down, no clonus. No hoffmans.  GAIT: uses cane for assistance      Imaging:  MRI L-spine 1/2015  Multilevel degenerative lumbar spondylosis resulting in:  L2-3 mild spinal canal narrowing  L3-4 moderate spinal canal narrowing  L4-5 severe spinal canal narrowing.    Assessment:  Mr. Black is a 66 y.o. male with low back and left leg pain   1. Lumbar radiculopathy    2. DDD (degenerative disc disease), lumbar    3. Lumbar stenosis        Plan:  1. I have stressed the importance of physical activity and exercise to improve overall health  2. Will monitor progress and consider repeating L4-5 and L5-S1 TFESI if indicated in the future  3. Percocet 7.5/325 mg q 12 h prn #60. UDS next clinic visit.  reviewed.   4. Start Gabapentin 300mg TID for radicular pain. Will start at 300 mg qhs for 1 week, if tolerated, increase to 300 mg in am and pm for 1 week, if tolerated increase to TID. We will titrate up to 9788-5031 mg based on therapeutic response and SEs.  5.  Follow up with PCP regarding elevated BP  6. F/u 1 month. UDS next clinic visit.   All  medication management was performed by Dr. Milan Buckley

## 2017-02-16 NOTE — MR AVS SNAPSHOT
[facility]  [address] [place] [address]  [address]  [facility] [place] [address]  Phone: [phone]                  Bret Black JrRobert   2017 9:30 AM   Office Visit    Description:  Male : 1950   Provider:  Rochelle Chavarria PA-C   Department:  [facility]           Reason for Visit     Low-back Pain           Diagnoses this Visit        Comments    Lumbar radiculopathy    -  Primary     DDD (degenerative disc disease), lumbar         Lumbar stenosis                To Do List           Future Appointments        Provider Department Dept Phone    3/16/2017 8:30 AM [facility] [phone]      Goals (5 Years of Data)     None       These Medications        Disp Refills Start End    oxycodone-acetaminophen (PERCOCET) 7.5-325 mg per tablet 60 tablet 0 2017 3/18/2017    Take 1 tablet by mouth every 12 (twelve) hours as needed for Pain. - Oral    Pharmacy: [facility] [address]  [facility] Ph #: [phone]         [facility] On Call     [facility] On Call Nurse Care Line -  Assistance  Registered nurses in the [facility] On Call Center provide clinical advisement, health education, appointment booking, and other advisory services.  Call for this free service at [phone].             Medications           Message regarding Medications     Verify the changes and/or additions to your medication regime listed below are the same as discussed with your clinician today.  If any of these changes or additions are incorrect, please notify your healthcare provider.             Verify that the below list of medications is an accurate representation of the medications you are currently taking.  If none reported, the list may be blank. If incorrect, please contact your healthcare provider. Carry this list with you in case of emergency.           Current Medications     CYANOCOBALAMIN, VITAMIN B-12, (VITAMIN  "B-12 ORAL) Take by mouth.    duloxetine (CYMBALTA) 60 MG capsule     fish oil-omega-3 fatty acids 300-1,000 mg capsule Take 2 g by mouth once daily.    glimepiride (AMARYL) 2 MG tablet Take 2 mg by mouth before breakfast.    metformin (GLUCOPHAGE) 1000 MG tablet Take 1,000 mg by mouth 2 (two) times daily.    multivitamin (ONE DAILY MULTIVITAMIN) per tablet Take 1 tablet by mouth once daily.    duloxetine (CYMBALTA) 30 MG capsule Take 60 mg by mouth once daily.     gabapentin (NEURONTIN) 300 MG capsule Take 1 capsule (300 mg total) by mouth 3 (three) times daily. Start Gabapentin 300mg TID for radicular pain. Will start at 300 mg qhs for 1 week, if tolerated, increase to 300 mg in am and pm for 1 week, if tolerated increase to TID. We will titrate up to 4553-5511 mg based on therapeutic response and SEs.    metformin (GLUCOPHAGE) 500 MG tablet Take 1,000 mg by mouth 2 (two) times daily with meals.    oxycodone-acetaminophen (PERCOCET) 7.5-325 mg per tablet Take 1 tablet by mouth every 12 (twelve) hours as needed for Pain.           Clinical Reference Information           Your Vitals Were     BP Pulse Height Weight BMI    163/96 91 5' 10" (1.778 m) 90.3 kg (199 lb 1.2 oz) 28.56 kg/m2      Blood Pressure          Most Recent Value    BP  (!)  163/96      Allergies as of 2/16/2017     No Known Allergies      Immunizations Administered on Date of Encounter - 2/16/2017     None      Language Assistance Services     ATTENTION: Language assistance services are available, free of charge. Please call [phone].      ATENCIÓN: Si vaughn serrano, tiene a johnson disposición servicios gratuitos de asistencia lingüística. Llame al [phone].     SEEMA Ý: N?u b?n nói Ti?ng Vi?t, có các d?ch v? h? tr? ngôn ng? mi?n phí dành cho b?n. G?i s? [phone].         [facility] complies with applicable Federal civil rights laws and does not discriminate on the basis of race, color, national origin, age, disability, or " sex.

## 2017-03-08 RX ORDER — OXYCODONE AND ACETAMINOPHEN 7.5; 325 MG/1; MG/1
1 TABLET ORAL EVERY 12 HOURS PRN
Qty: 60 TABLET | Refills: 0 | Status: SHIPPED | OUTPATIENT
Start: 2017-03-17 | End: 2017-04-15

## 2019-04-03 PROBLEM — Z85.828 HISTORY OF SKIN CANCER: Status: ACTIVE | Noted: 2019-04-03

## 2019-04-03 PROBLEM — M15.9 GENERALIZED OSTEOARTHRITIS: Status: ACTIVE | Noted: 2019-04-03

## 2019-04-03 PROBLEM — E29.1 MALE HYPOGONADISM: Status: ACTIVE | Noted: 2019-04-03

## 2019-04-03 PROBLEM — F32.A ANXIETY AND DEPRESSION: Status: ACTIVE | Noted: 2019-04-03

## 2019-04-03 PROBLEM — E11.9 DIABETES MELLITUS WITHOUT COMPLICATION: Status: ACTIVE | Noted: 2019-04-03

## 2019-04-03 PROBLEM — Z12.5 SCREENING FOR PROSTATE CANCER: Status: ACTIVE | Noted: 2019-04-03

## 2019-04-03 PROBLEM — F41.9 ANXIETY AND DEPRESSION: Status: ACTIVE | Noted: 2019-04-03

## 2019-04-03 PROBLEM — Z79.899 ENCOUNTER FOR LONG-TERM (CURRENT) USE OF MEDICATIONS: Status: ACTIVE | Noted: 2019-04-03

## 2019-04-03 PROBLEM — E11.43 DIABETIC AUTONOMIC NEUROPATHY ASSOCIATED WITH TYPE 2 DIABETES MELLITUS: Status: ACTIVE | Noted: 2019-04-03

## 2019-04-03 PROBLEM — Z00.00 WELL ADULT EXAM: Status: ACTIVE | Noted: 2019-04-03

## 2019-09-24 PROBLEM — E78.00 ELEVATED LDL CHOLESTEROL LEVEL: Status: ACTIVE | Noted: 2019-09-24

## 2020-06-23 PROBLEM — Z12.5 SCREENING PSA (PROSTATE SPECIFIC ANTIGEN): Status: ACTIVE | Noted: 2020-06-23

## 2020-06-23 PROBLEM — Z12.11 SCREEN FOR COLON CANCER: Status: ACTIVE | Noted: 2020-06-23

## 2020-06-23 PROBLEM — Z00.00 WELL ADULT EXAM: Status: ACTIVE | Noted: 2020-06-23

## 2020-09-28 PROBLEM — Z00.00 WELL ADULT EXAM: Status: RESOLVED | Noted: 2020-06-23 | Resolved: 2020-09-28

## 2020-12-22 PROBLEM — L40.9 PSORIASIS: Status: ACTIVE | Noted: 2020-12-22

## 2021-06-07 PROBLEM — Z00.00 WELL ADULT EXAM: Status: ACTIVE | Noted: 2021-06-07

## 2021-09-06 PROBLEM — Z00.00 WELL ADULT EXAM: Status: RESOLVED | Noted: 2021-06-07 | Resolved: 2021-09-06

## 2022-11-03 ENCOUNTER — TELEPHONE (OUTPATIENT)
Dept: OPHTHALMOLOGY | Facility: CLINIC | Age: 72
End: 2022-11-03
Payer: MEDICARE

## 2022-11-03 NOTE — TELEPHONE ENCOUNTER
----- Message from Vik Rivera sent at 11/3/2022 10:02 AM CDT -----  Contact: self  Type: Sooner Appointment Request        Caller is requesting a sooner appointment. Caller declined first available appointment listed below. Caller will not accept being placed on the waitlist and is requesting a message be sent to doctor.        Name of Caller: Patient   When is the first available appointment? 2/10/2022  Best Call Back Number: [phone] (mrs. Black)  Additional Information: Patient wife state he cant see big writing at all and he has diabetes worried that might be affecting his eyesight. Plz get pt in sooner than February. Thanks

## 2022-11-08 ENCOUNTER — TELEPHONE (OUTPATIENT)
Dept: OPHTHALMOLOGY | Facility: CLINIC | Age: 72
End: 2022-11-08
Payer: MEDICARE

## 2022-11-08 NOTE — TELEPHONE ENCOUNTER
Spoke to pt wife and made her aware due to insurance, glasses check is not covered. We do not take [facility] for routine exams. Gave the number of places in the area who do       -TD     ----- Message from Anel Sutherland sent at 11/8/2022  8:54 AM CST -----  Contact: pt  Type: Needs Medical Advice         Who Called: pt  Best Call Back Number: [phone]   Additional Information: Requesting a call back regarding pt wife is asking for office to call her.  She is needing to know that everything is going to be able to be done in 1 visit due to they can not afford multi copay. Pt has appt today @ 3pm       Please Advise- Thank you

## 2023-10-23 PROBLEM — Z00.00 ANNUAL PHYSICAL EXAM: Status: ACTIVE | Noted: 2023-10-23

## 2023-11-02 PROBLEM — M54.16 RIGHT LUMBAR RADICULOPATHY: Status: ACTIVE | Noted: 2023-11-02

## 2024-01-22 PROBLEM — Z00.00 ANNUAL PHYSICAL EXAM: Status: RESOLVED | Noted: 2023-10-23 | Resolved: 2024-01-22

## 2024-07-05 PROBLEM — E78.5 DYSLIPIDEMIA: Status: ACTIVE | Noted: 2024-07-05

## 2025-01-16 ENCOUNTER — TELEPHONE (OUTPATIENT)
Dept: NEUROSURGERY | Facility: CLINIC | Age: 75
End: 2025-01-16

## 2025-01-16 PROBLEM — Z00.00 ANNUAL WELLNESS VISIT: Status: ACTIVE | Noted: 2025-01-16

## 2025-01-16 PROBLEM — E11.65 TYPE 2 DIABETES MELLITUS WITH HYPERGLYCEMIA, WITHOUT LONG-TERM CURRENT USE OF INSULIN: Status: ACTIVE | Noted: 2025-01-16

## 2025-01-17 ENCOUNTER — TELEPHONE (OUTPATIENT)
Dept: NEUROSURGERY | Facility: CLINIC | Age: 75
End: 2025-01-17

## 2025-01-17 NOTE — TELEPHONE ENCOUNTER
Called patient in regards to referral placed to Neurosurgery/Victorino Hernandez MD. No updated imaging in chart. No answer. IESHAM

## 2025-01-24 ENCOUNTER — TELEPHONE (OUTPATIENT)
Dept: NEUROSURGERY | Facility: CLINIC | Age: 75
End: 2025-01-24

## 2025-01-24 NOTE — TELEPHONE ENCOUNTER
Called patient in regards to referral placed to Neurosurgery/Victorino Hernandez MD. No updated imaging in chart. No answer. Unable to LVM

## 2025-01-27 ENCOUNTER — TELEPHONE (OUTPATIENT)
Dept: NEUROSURGERY | Facility: CLINIC | Age: 75
End: 2025-01-27

## 2025-01-27 NOTE — TELEPHONE ENCOUNTER
Called patient in regards to referral placed to Neurosurgery/Victorino Hernandez MD. No updated imaging in chart. No answer. IESHAM

## (undated) DEVICE — APPLICATOR CHLORAPREP CLR 10.5

## (undated) DEVICE — SYR 10CC LUER LOCK

## (undated) DEVICE — GLOVE SURG ULTRA TOUCH 7.5

## (undated) DEVICE — NDL HYPODERMIC BLUNT 18G 1.5IN

## (undated) DEVICE — NDL SPINAL SPINOCAN 22GX3.5

## (undated) DEVICE — TUBING MINIBORE EXTENSION

## (undated) DEVICE — SYS LABEL CORRECT MED

## (undated) DEVICE — NDL SAFETY 25G X 1.5 ECLIPSE

## (undated) DEVICE — SYR DISP LL 5CC